# Patient Record
Sex: FEMALE | Race: WHITE | URBAN - METROPOLITAN AREA
[De-identification: names, ages, dates, MRNs, and addresses within clinical notes are randomized per-mention and may not be internally consistent; named-entity substitution may affect disease eponyms.]

---

## 2024-06-24 ENCOUNTER — OFFICE VISIT (OUTPATIENT)
Dept: CARDIOLOGY CLINIC | Age: 79
End: 2024-06-24
Payer: MEDICARE

## 2024-06-24 VITALS
BODY MASS INDEX: 25.83 KG/M2 | OXYGEN SATURATION: 89 % | DIASTOLIC BLOOD PRESSURE: 80 MMHG | WEIGHT: 136.8 LBS | SYSTOLIC BLOOD PRESSURE: 128 MMHG | HEART RATE: 66 BPM | HEIGHT: 61 IN

## 2024-06-24 DIAGNOSIS — R06.02 SOB (SHORTNESS OF BREATH): Primary | ICD-10-CM

## 2024-06-24 DIAGNOSIS — R06.02 SHORTNESS OF BREATH: ICD-10-CM

## 2024-06-24 DIAGNOSIS — M79.89 LEG SWELLING: ICD-10-CM

## 2024-06-24 DIAGNOSIS — E04.1 THYROID CYST: ICD-10-CM

## 2024-06-24 PROCEDURE — G8419 CALC BMI OUT NRM PARAM NOF/U: HCPCS | Performed by: INTERNAL MEDICINE

## 2024-06-24 PROCEDURE — 99204 OFFICE O/P NEW MOD 45 MIN: CPT | Performed by: INTERNAL MEDICINE

## 2024-06-24 PROCEDURE — G8427 DOCREV CUR MEDS BY ELIG CLIN: HCPCS | Performed by: INTERNAL MEDICINE

## 2024-06-24 PROCEDURE — 1090F PRES/ABSN URINE INCON ASSESS: CPT | Performed by: INTERNAL MEDICINE

## 2024-06-24 PROCEDURE — 93000 ELECTROCARDIOGRAM COMPLETE: CPT | Performed by: INTERNAL MEDICINE

## 2024-06-24 RX ORDER — OMEPRAZOLE 40 MG/1
40 CAPSULE, DELAYED RELEASE ORAL 2 TIMES DAILY
COMMUNITY
Start: 2023-08-25

## 2024-06-24 RX ORDER — FLUTICASONE PROPIONATE AND SALMETEROL 250; 50 UG/1; UG/1
1 POWDER RESPIRATORY (INHALATION) 2 TIMES DAILY
COMMUNITY

## 2024-06-24 RX ORDER — TRIAMCINOLONE ACETONIDE 55 UG/1
1 SPRAY, METERED NASAL DAILY
COMMUNITY

## 2024-06-24 RX ORDER — ATORVASTATIN CALCIUM 10 MG/1
10 TABLET, FILM COATED ORAL DAILY
COMMUNITY
Start: 2023-08-24

## 2024-06-24 NOTE — PATIENT INSTRUCTIONS
Please be informed that if you contact our office outside of normal business hours the physician on call cannot help with any scheduling or rescheduling issues, procedure instruction questions or any type of medication issue.    We advise you for any urgent/emergency that you go to the nearest emergency room!    PLEASE CALL OUR OFFICE DURING NORMAL BUSINESS HOURS    Monday - Friday   8 am to 5 pm    [place]: [phone]    [place]: [phone]    [place]:  [phone]  **It is YOUR responsibilty to bring medication bottles and/or updated medication list to EACH APPOINTMENT. This will allow us to better serve you and all your healthcare needs**  We are committed to providing you the best care possible.    If you receive a survey after visiting one of our offices, please take time to share your experience concerning your physician office visit.  These surveys are confidential and no health information about you is shared.    We are eager to improve for you and we are counting on your feedback to help make that happen.      Thank you for allowing us to care for you today!   We want to ensure we can follow your treatment plan and we strive to give you the best outcomes and experience possible.   If you ever have a life threatening emergency and call 911 - for an ambulance (EMS)   Our providers can only care for you at:   [facility] or [facility].   Even if you have someone take you or you drive yourself we can only care for you in a [facility] facility. Our providers are not setup at the other healthcare locations!

## 2024-06-24 NOTE — PROGRESS NOTES
CARDIOLOGY CONSULT NOTE   Reason for consultation:  shortness of breath    Referring physician:  No admitting provider for patient encounter.     Primary care physician: Rebecca Oliver, LISET - CNP      Dear   Thanks for the consult.    History of present illness:Hannah is a 78 y.o.year old who  presents with for shortness of breath which is moderate to severe now, intermittent, self limiting, not associated with cough or fever, gets worse with activity and better with rest,she had echo at [facility], had milD MR and diastolic dysfunction,    Chief Complaint   Patient presents with    New Patient     Pt states palpitations, states that they come and go, pt states SOB constantly, pt states no other cardiac sx      Blood pressure, cholesterol, blood glucose and weight are well controlled.    Past medical history:    has a past medical history of GERD (gastroesophageal reflux disease) and MVP (mitral valve prolapse).  Past surgical history:   has a past surgical history that includes laparoscopy and Colonoscopy (1980's).  Social History:   reports that she has never smoked. She does not have any smokeless tobacco history on file. She reports current alcohol use. She reports that she does not use drugs.  Family history:   no family history of CAD, STROKE of DM    No Known Allergies    No current facility-administered medications for this visit.    Current Outpatient Medications   Medication Sig Dispense Refill    atorvastatin (LIPITOR) 10 MG tablet Take 1 tablet by mouth daily      omeprazole (PRILOSEC) 40 MG delayed release capsule Take 1 capsule by mouth 2 times daily      fluticasone-salmeterol (ADVAIR) 250-50 MCG/ACT AEPB diskus inhaler Inhale 1 puff into the lungs 2 times daily      triamcinolone (NASACORT) 55 MCG/ACT nasal inhaler 1 spray by Nasal route daily      vitamin D3 (CHOLECALCIFEROL) 400 UNITS TABS tablet Take 1 tablet by mouth daily      Multiple Vitamins-Minerals (MULTI FOR HER) TABS Take by

## 2024-07-02 ENCOUNTER — TELEPHONE (OUTPATIENT)
Dept: CARDIOLOGY CLINIC | Age: 79
End: 2024-07-02

## 2024-07-02 NOTE — TELEPHONE ENCOUNTER
Test Ordered: Nuclear   /  Insurance: [employer]  /  Authorization Status: Approved:  [facility]# [id number], Exp 9/12/24

## 2024-07-09 ENCOUNTER — TELEPHONE (OUTPATIENT)
Dept: CARDIOLOGY CLINIC | Age: 79
End: 2024-07-09

## 2024-07-09 NOTE — TELEPHONE ENCOUNTER
Faxed referral, demographics, insurance card, and office note to Dr. Hare's office at Fax# [phone]. Ph# [phone].

## 2024-07-12 ENCOUNTER — TELEPHONE (OUTPATIENT)
Dept: CARDIOLOGY CLINIC | Age: 79
End: 2024-07-12

## 2024-07-12 NOTE — TELEPHONE ENCOUNTER
Called patient to provide her with results. Patient verbalized understanding and was advised of her follow up appointment.         Significant bilateral CFV venous insufficiency.  Small reticular varicosities on anterior bilateral calves noted.  Too small to assess.    No evidence of thrombosis.     Compression socks recommended

## 2024-07-22 ENCOUNTER — OFFICE VISIT (OUTPATIENT)
Dept: CARDIOLOGY CLINIC | Age: 79
End: 2024-07-22
Payer: MEDICARE

## 2024-07-22 VITALS
BODY MASS INDEX: 25.21 KG/M2 | HEART RATE: 68 BPM | DIASTOLIC BLOOD PRESSURE: 74 MMHG | WEIGHT: 137 LBS | OXYGEN SATURATION: 96 % | HEIGHT: 62 IN | SYSTOLIC BLOOD PRESSURE: 130 MMHG

## 2024-07-22 DIAGNOSIS — E04.1 THYROID CYST: ICD-10-CM

## 2024-07-22 DIAGNOSIS — M79.89 LEG SWELLING: ICD-10-CM

## 2024-07-22 DIAGNOSIS — R06.02 SHORTNESS OF BREATH: Primary | ICD-10-CM

## 2024-07-22 DIAGNOSIS — R06.02 SOB (SHORTNESS OF BREATH): ICD-10-CM

## 2024-07-22 PROCEDURE — 1123F ACP DISCUSS/DSCN MKR DOCD: CPT | Performed by: INTERNAL MEDICINE

## 2024-07-22 PROCEDURE — 1090F PRES/ABSN URINE INCON ASSESS: CPT | Performed by: INTERNAL MEDICINE

## 2024-07-22 PROCEDURE — 99214 OFFICE O/P EST MOD 30 MIN: CPT | Performed by: INTERNAL MEDICINE

## 2024-07-22 PROCEDURE — 1036F TOBACCO NON-USER: CPT | Performed by: INTERNAL MEDICINE

## 2024-07-22 PROCEDURE — G8427 DOCREV CUR MEDS BY ELIG CLIN: HCPCS | Performed by: INTERNAL MEDICINE

## 2024-07-22 PROCEDURE — G8419 CALC BMI OUT NRM PARAM NOF/U: HCPCS | Performed by: INTERNAL MEDICINE

## 2024-07-22 PROCEDURE — G8400 PT W/DXA NO RESULTS DOC: HCPCS | Performed by: INTERNAL MEDICINE

## 2024-07-22 NOTE — PROGRESS NOTES
Jesusita Lilly MD        OFFICE  FOLLOWUP NOTE    Chief complaints: patient is here for management of SOB, CVI,GERD, moDERATR MR  Subjective: patient feels better, no chest pain, + shortness of breath, no dizziness, no palpitations    WILDER Young is a 78 y.o.year old who  has a past medical history of GERD (gastroesophageal reflux disease) and MVP (mitral valve prolapse). and presents for management ofDyspnea: stress test ordered, echo reviewed, has mild to moderate MR, she feels she cannot take a full breath, she wants to yawn and couldnot yawn,.her holter monitor is reviewed,hr was  bPM,WILL GET STRESS TEST AND MAY NEED LHC  GERD had EGD,continue PPI  LEFT THYROID CALCIFICATION: REFER TO ENDOCRINOLOGY  Dysllipidemia: continue statins  VARICOSE VEINS: RECOMMEND TO USE COMPRESSION SCOck, VENOUS DOPPLER ORDERED which are well controlled      Current Outpatient Medications   Medication Sig Dispense Refill    atorvastatin (LIPITOR) 10 MG tablet Take 1 tablet by mouth daily      omeprazole (PRILOSEC) 40 MG delayed release capsule Take 1 capsule by mouth 2 times daily      fluticasone-salmeterol (ADVAIR) 250-50 MCG/ACT AEPB diskus inhaler Inhale 1 puff into the lungs 2 times daily      triamcinolone (NASACORT) 55 MCG/ACT nasal inhaler 1 spray by Nasal route daily      vitamin D3 (CHOLECALCIFEROL) 400 UNITS TABS tablet Take 1 tablet by mouth daily      Multiple Vitamins-Minerals (MULTI FOR HER) TABS Take by mouth      Probiotic Product (PRO-BIOTIC BLEND) CAPS Take by mouth (Patient not taking: Reported on 6/24/2024)       No current facility-administered medications for this visit.     Allergies: Patient has no known allergies.  Past Medical History:   Diagnosis Date    GERD (gastroesophageal reflux disease)     MVP (mitral valve prolapse)      Past Surgical History:   Procedure Laterality Date    COLONOSCOPY  1980's    LAPAROSCOPY       No family history on file.  Social History     Tobacco Use